# Patient Record
Sex: MALE | Race: OTHER | URBAN - METROPOLITAN AREA
[De-identification: names, ages, dates, MRNs, and addresses within clinical notes are randomized per-mention and may not be internally consistent; named-entity substitution may affect disease eponyms.]

---

## 2024-09-27 ENCOUNTER — EMERGENCY (EMERGENCY)
Facility: HOSPITAL | Age: 24
LOS: 1 days | Discharge: HOME | End: 2024-09-27
Attending: EMERGENCY MEDICINE | Admitting: EMERGENCY MEDICINE
Payer: COMMERCIAL

## 2024-09-27 VITALS
DIASTOLIC BLOOD PRESSURE: 76 MMHG | SYSTOLIC BLOOD PRESSURE: 120 MMHG | OXYGEN SATURATION: 96 % | HEART RATE: 80 BPM | RESPIRATION RATE: 17 BRPM | TEMPERATURE: 98 F

## 2024-09-27 VITALS
SYSTOLIC BLOOD PRESSURE: 122 MMHG | DIASTOLIC BLOOD PRESSURE: 82 MMHG | HEART RATE: 88 BPM | OXYGEN SATURATION: 95 % | WEIGHT: 175.93 LBS | HEIGHT: 66 IN | TEMPERATURE: 98 F | RESPIRATION RATE: 16 BRPM

## 2024-09-27 DIAGNOSIS — R07.89 OTHER CHEST PAIN: ICD-10-CM

## 2024-09-27 DIAGNOSIS — Z88.8 ALLERGY STATUS TO OTHER DRUGS, MEDICAMENTS AND BIOLOGICAL SUBSTANCES: ICD-10-CM

## 2024-09-27 DIAGNOSIS — R05.1 ACUTE COUGH: ICD-10-CM

## 2024-09-27 DIAGNOSIS — K51.90 ULCERATIVE COLITIS, UNSPECIFIED, WITHOUT COMPLICATIONS: ICD-10-CM

## 2024-09-27 DIAGNOSIS — F17.200 NICOTINE DEPENDENCE, UNSPECIFIED, UNCOMPLICATED: ICD-10-CM

## 2024-09-27 DIAGNOSIS — R20.2 PARESTHESIA OF SKIN: ICD-10-CM

## 2024-09-27 PROCEDURE — 96374 THER/PROPH/DIAG INJ IV PUSH: CPT

## 2024-09-27 PROCEDURE — 99284 EMERGENCY DEPT VISIT MOD MDM: CPT

## 2024-09-27 PROCEDURE — 99284 EMERGENCY DEPT VISIT MOD MDM: CPT | Mod: 25

## 2024-09-27 RX ORDER — FAMOTIDINE 10 MG/ML
20 INJECTION INTRAVENOUS ONCE
Refills: 0 | Status: COMPLETED | OUTPATIENT
Start: 2024-09-27 | End: 2024-09-27

## 2024-09-27 RX ADMIN — FAMOTIDINE 20 MILLIGRAM(S): 10 INJECTION INTRAVENOUS at 12:14

## 2024-09-27 NOTE — ED PROVIDER NOTE - OBJECTIVE STATEMENT
Pt is a 23yo m, h/o UC, receives Avsola infusions, last time 8 wks ago, who was biba from infusion center after he had an allergic rxn while receiving 4th [facility] infusion today. Pt states he began to cough, then felt throat feel tight, heat and tingling to his face, chest tightness. Infusion was dc'd and pt was given beandryl and solumedrol. Pt vomited and felt immediately improved. No complaints at this time. No rash/ hives, lip/ tongue swelling, hoarse voice, abd pain, fever, chills...

## 2024-09-27 NOTE — ED PROVIDER NOTE - PATIENT PORTAL LINK FT
You can access the FollowMyHealth Patient Portal offered by [facility] by registering at the following website: [email or web address]. By joining [employer]’s FollowMyHealth portal, you will also be able to view your health information using other applications (apps) compatible with our system.

## 2024-09-27 NOTE — ED PROVIDER NOTE - PHYSICAL EXAMINATION
VITAL SIGNS: I have reviewed nursing notes and confirm.  CONSTITUTIONAL: Well appearing, in no acute distress.   SKIN:  warm and dry, no acute rash.   HEAD:  normocephalic, atraumatic.  EYES: EOM intact; conjunctiva and sclera clear.  ENT: No nasal discharge; airway clear. O/P: no mucosal edema. Uvula midline and non-edematous. No stridor. No lip/ tongue swelling.   NECK: Supple.  CARD: S1, S2, Regular rate and rhythm.   RESP:  Clear to auscultation b/l, no wheezes, rales or rhonchi.  ABD: Normal bowel sounds; soft; non-distended; non-tender; no guarding/ rebound.  EXT: Normal ROM. No peripheral edema. Pulses intact and equal b/l.  NEURO: Alert, oriented, grossly unremarkable

## 2024-09-27 NOTE — ED PROVIDER NOTE - CLINICAL SUMMARY MEDICAL DECISION MAKING FREE TEXT BOX
Impression: 25yo m, h/o , receives [facility] infusions, last time 8 wks ago, who was biba from infusion center after he had an allergic rxn while receiving 4th [facility] infusion today. Pt states he began to cough, then felt throat feel tight, heat and tingling to his face, chest tightness. Infusion was dc'd and pt was given beandryl and solumedrol. Pt vomited and felt immediately improved. No complaints at this time. No rash/ hives, lip/ tongue swelling, hoarse voice, abd pain, fever, chills... Afebrile. HDS. Pt is well appearing. No resp distress/ stridor, breathing and speaking comfortably. Sx's likely due to allergic rxn to Avsola. Will give pepcid and continue to monitor for rebound sx's. Impression: 23yo m, h/o , receives [facility] infusions, last time 8 wks ago, who was biba from infusion center after he had an allergic rxn while receiving 4th [facility]sola infusion today. Pt states he began to cough, then felt throat feel tight, heat and tingling to his face, chest tightness. Infusion was dc'd and pt was given beandryl and solumedrol. Pt vomited and felt immediately improved. No complaints at this time. No rash/ hives, lip/ tongue swelling, hoarse voice, abd pain, fever, chills... Afebrile. HDS. Pt is well appearing. No resp distress/ stridor, breathing and speaking comfortably. Sx's likely due to allergic rxn to Avsola. Will give pepcid and continue to monitor for rebound sx's.    Pt monitored in ed without events, sleeping comfortably, no resp distress. ED evaluation and management discussed with the patient in detail.  Close PMD/ allergy follow up encouraged.  Strict ED return instructions discussed in detail and patient given the opportunity to ask any questions about their discharge diagnosis and instructions. Patient verbalized understanding.

## 2024-09-27 NOTE — ED ADULT TRIAGE NOTE - CHIEF COMPLAINT QUOTE
Satisfactory Satisfactory Pt presents to ED C/O "SOB, worsening cough, throat feeling tight, hot flashes" while receiving UC IV infusion. Pt states, " This was my fourth infusion I've never had a reaction before". Infusion center gave 50mg benadryl, solumedrol PTA. Pt states, " Now I feeling fine" protecting own airway.

## 2024-09-27 NOTE — ED PROVIDER NOTE - NSFOLLOWUPINSTRUCTIONS_ED_ALL_ED_FT
Carry an epi-pen with you at all times and use in case of emergency. Take prednisone and pepcid once a day for the next 4 days.  Take Benadryl every 6 hours as needed for any itching, rash, shortness of breath, chest tightness...  Follow-up with your primary care doctor, gastroenterologist and allergist for reevaluation, further testing, and/or management.  Return to ER for any new or worsening symptoms.        Anaphylactic Reaction, Adult  An anaphylactic reaction is a sudden and very bad allergic reaction. It must be treated right away.    What are the causes?  Being exposed to things you are allergic to (allergens). These may be:  Foods, like peanuts, wheat, shellfish, milk, or eggs.  Medicines.  Insect bites or stings.  Blood or parts of blood that have been given to you for treatment (transfusions).  Chemicals. These include latex and dyes used in food and medical tests.  What increases the risk?  Having allergies.  Having had this kind of reaction before.  Having a family history of this kind of reaction.  Having asthma or eczema.  What are the signs or symptoms?  Feeling warm in the face (flushed). Your face may turn red.  Hives. These are itchy, red, swollen areas of skin.  Swelling of the eyes, lips, face, mouth, tongue, or throat.  Trouble breathing or speaking.  Trouble swallowing.  Feeling dizzy or fainting.  Pain or cramps in your belly (abdomen).  Vomiting or watery poop (diarrhea).  How is this treated?  A person using an auto-injector pen in the thigh.  If you are having a bad allergic reaction, you should:  Give yourself a shot using a device filled with epinephrine (auto-injector pen). Your doctor will teach you how to use the pen.  Call for help. If you use an auto-injector pen, you must still get treated in the hospital. You may be given:  Medicines.  Oxygen.  Fluids in an IV tube.  Follow these instructions at home:  Safety    Always keep an auto-injector pen with you. If you can, carry two pens. Use the pen as told by your doctor. After you use the pen, get more medicine for it right away.  Do not drive after you have a reaction. Wait until your doctor says it is safe to drive.  Make sure that you, the people who live with you, and your employer know:  What you are allergic to.  How to use your auto-injector pen.  If told by your doctor, wear a bracelet or necklace that says you have an allergy.  Learn the signs of a very bad allergic reaction.  Work with your doctors to make a plan for what to do if you have a very bad reaction.  General instructions    Take over-the-counter and prescription medicines only as told by your doctor.  If you have a rash or itchy skin:  Use an allergy medicine as told by your doctor.  Put cold, wet cloths on your skin.  Take a cool bath or shower. Do not use hot water.  Tell all of your doctors that you have an allergy.  How is this prevented?  Avoid things that have given you a bad reaction before.  Tell your  about your allergy when you go out to eat. If you are not sure if your meal contains food that you are allergic to, ask your  before you eat it.  Where to find more information  American Academy of Allergy, Asthma, and Immunology (AAAAI): aaaai.org  Contact a doctor if:  Your auto-injector pen is .  Get help right away if:  You have a bad allergic reaction.  You use your auto-injector pen. You must go to the hospital even if the medicine seems to be working.  These symptoms may be an emergency. Use the auto-injector pen right away. Then call 911.  Do not wait to see if the symptoms will go away.  Do not drive yourself to the hospital.  This information is not intended to replace advice given to you by your health care provider. Make sure you discuss any questions you have with your health care provider.    Document Revised: 2023 Document Reviewed: 2023  Elsevier Patient Education ©  [employer]

## 2024-09-27 NOTE — ED ADULT NURSE NOTE - CHIEF COMPLAINT QUOTE
Pt presents to ED C/O "SOB, worsening cough, throat feeling tight, hot flashes" while receiving UC IV infusion. Pt states, " This was my fourth infusion I've never had a reaction before". Infusion center gave 50mg benadryl, solumedrol PTA. Pt states, " Now I feeling fine" protecting own airway.

## 2024-09-27 NOTE — ED ADULT NURSE NOTE - OBJECTIVE STATEMENT
24yM BIBA with the c/c of allergy reaction from the infusion treatment for Ulcerative Colitis treatment 4/4 session. Pt did not complete the infusion started to feel throat tight, itchy, SOB, symptom subside after the Benadryl and prednisone. Pt denies any symptom at the moment.

## 2024-09-30 RX ORDER — EPINEPHRINE 0.3 MG/.3ML
0.3 INJECTION INTRAMUSCULAR; SUBCUTANEOUS
Qty: 1 | Refills: 0
Start: 2024-09-30

## 2024-09-30 RX ORDER — PREDNISONE 10 MG
1 TABLET, DOSE PACK ORAL
Qty: 4 | Refills: 0
Start: 2024-09-30 | End: 2024-10-03